# Patient Record
Sex: FEMALE | Race: OTHER | NOT HISPANIC OR LATINO | URBAN - METROPOLITAN AREA
[De-identification: names, ages, dates, MRNs, and addresses within clinical notes are randomized per-mention and may not be internally consistent; named-entity substitution may affect disease eponyms.]

---

## 2019-04-11 ENCOUNTER — APPOINTMENT (RX ONLY)
Dept: URBAN - METROPOLITAN AREA CLINIC 170 | Facility: CLINIC | Age: 34
Setting detail: DERMATOLOGY
End: 2019-04-11

## 2019-04-11 DIAGNOSIS — L65.8 OTHER SPECIFIED NONSCARRING HAIR LOSS: ICD-10-CM

## 2019-04-11 PROBLEM — F41.9 ANXIETY DISORDER, UNSPECIFIED: Status: ACTIVE | Noted: 2019-04-11

## 2019-04-11 PROCEDURE — ? ADDITIONAL NOTES

## 2019-04-11 PROCEDURE — ? COUNSELING

## 2019-04-11 PROCEDURE — ? PRESCRIPTION

## 2019-04-11 PROCEDURE — 99201: CPT

## 2019-04-11 PROCEDURE — ? PHOTO-DOCUMENTATION

## 2019-04-11 RX ORDER — FLUOCINONIDE 0.5 MG/ML
SOLUTION TOPICAL
Qty: 2 | Refills: 5 | Status: ERX | COMMUNITY
Start: 2019-04-11

## 2019-04-11 RX ADMIN — FLUOCINONIDE: 0.5 SOLUTION TOPICAL at 18:37

## 2019-04-11 ASSESSMENT — LOCATION ZONE DERM: LOCATION ZONE: FACE

## 2019-04-11 ASSESSMENT — LOCATION SIMPLE DESCRIPTION DERM: LOCATION SIMPLE: SUPERIOR FOREHEAD

## 2019-04-11 ASSESSMENT — LOCATION DETAILED DESCRIPTION DERM: LOCATION DETAILED: SUPERIOR MID FOREHEAD

## 2019-04-11 NOTE — PROCEDURE: ADDITIONAL NOTES
Additional Notes: If Fluocinonide solution no help can do kenalog injections. Lab ordered. Patient could have both traction and areata
Detail Level: Simple

## 2019-04-11 NOTE — HPI: HAIR LOSS
Previous Labs: Yes
How Did The Hair Loss Occur?: sudden in onset
How Severe Is Your Hair Loss?: mild
When Were The Labs Drawn? (Drawn...): Feb 28, 2019
Lab Details: Cholesterol was high
What Hair Products Do You Use?: Cantu shampoo and conditioner, leave in condition moisturizer. Wears a lot of wigs.
Additional History: Suddenly appeared, then gradually started growing back when patient was pregnant.

## 2019-04-11 NOTE — PROCEDURE: PHOTO-DOCUMENTATION
Details (Free Text): Recent labs from primary. Lab ordered. Scalp picture
Detail Level: Zone
Photo Preface (Leave Blank If You Do Not Want): Photographs were obtained today